# Patient Record
Sex: MALE | Race: WHITE | NOT HISPANIC OR LATINO | Employment: FULL TIME | URBAN - METROPOLITAN AREA
[De-identification: names, ages, dates, MRNs, and addresses within clinical notes are randomized per-mention and may not be internally consistent; named-entity substitution may affect disease eponyms.]

---

## 2020-07-23 ENCOUNTER — OFFICE VISIT (OUTPATIENT)
Dept: URGENT CARE | Facility: URGENT CARE | Age: 40
End: 2020-07-23
Payer: OTHER MISCELLANEOUS

## 2020-07-23 VITALS
DIASTOLIC BLOOD PRESSURE: 78 MMHG | RESPIRATION RATE: 16 BRPM | OXYGEN SATURATION: 100 % | WEIGHT: 215 LBS | SYSTOLIC BLOOD PRESSURE: 116 MMHG | HEART RATE: 60 BPM

## 2020-07-23 DIAGNOSIS — S61.412A LACERATION OF LEFT HAND WITHOUT FOREIGN BODY, INITIAL ENCOUNTER: Primary | ICD-10-CM

## 2020-07-23 PROCEDURE — 12001 RPR S/N/AX/GEN/TRNK 2.5CM/<: CPT | Performed by: NURSE PRACTITIONER

## 2020-07-23 NOTE — PATIENT INSTRUCTIONS
Leave dressing on for 24 hours then cleanse daily with soap and water  Keep covered if going to be working with hands  Tylenol and ibuprofen for pain  Ice to help with pain and swelling.     Patient Education     Extremity Laceration: Stitches, Staples, or Tape  A laceration is a cut through the skin. If it is deep, it may require stitches or staples to close so it can heal. Minor cuts may be treated with surgical tape closures, or skin glue.  X-rays may be done if something may have entered the skin through the cut. You may also need a tetanus shot if you are not up to date on this vaccine.  Home care    Follow the healthcare provider s instructions on how to care for the cut.    Wash your hands with soap and warm water before and after caring for your wound. This is to help prevent infection.    Keep the wound clean and dry. If a bandage was applied and it becomes wet or dirty, replace it. Otherwise, leave it in place for the first 24 hours, then change it once a day or as directed.    If stitches or staples were used, clean the wound daily:  ? After removing the bandage, wash the area with soap and water. Use a wet cotton swab to loosen and remove any blood or crust that forms.  ? After cleaning, keep the wound clean and dry. Talk with your healthcare provider before putting any antibiotic ointment on the wound. Reapply the bandage.    You may remove the bandage to shower as usual after the first 24 hours, but don't soak the area in water (no swimming) until the stitches or staples are removed.    If surgical tape closures were used, keep the area clean and dry. If it becomes wet, blot it dry with a towel. Let the surgical tape fall off on its own.    The healthcare provider may prescribe an antibiotic cream or ointment to prevent infection. He or she may also prescribe an antibiotic pill. Don't stop taking this medicine until you have finished it all or the provider tells you to stop.    The provider may also  prescribe medicine for pain. Follow the instructions for taking these medicines.    Don't do activities that may reopen your wound.  Follow-up care  Follow up with your healthcare provider, or as advised. Most skin wounds heal within 10 days. But an infection may sometimes occur even with proper treatment. Check the wound daily for the signs of infection listed below. Stitches and staples should be removed within 7 to14 days. If surgical tape closures were used, you may remove them after 10 days if they have not fallen off by then.   When to seek medical advice  Call your healthcare provider right away if any of these occur:    Wound bleeding not controlled by direct pressure    Signs of infection, including increasing pain in the wound, increasing wound redness or swelling, or pus or bad odor coming from the wound    Fever of 100.4 F (38 C) or higher, or as directed by your healthcare provider    Stitches or staples come apart or fall out or surgical tape falls off before 7 days    Wound edges reopen    Wound changes colors    Numbness occurs around the wound     Decreased movement around the injured area  Date Last Reviewed: 7/1/2017 2000-2019 [employer]. [address] [place] [id number]. All rights reserved. This information is not intended as a substitute for professional medical care. Always follow your healthcare professional's instructions.

## 2020-07-23 NOTE — PROGRESS NOTES
SUBJECTIVE:     Chief Complaint   Patient presents with     Laceration     Pt cut L hand on a bandsaw blade     Dennis Fox is a 40 year old male who presents to the clinic with a laceration on the left hand sustained 30 minutes ago.  This is a work related and accidental injury.    Mechanism of injury: band saw.    Associated symptoms: Denies numbness, weakness, or loss of function  Last tetanus booster within 10 years: yes - 2017.    EXAM:   The patient appears today in alert,no apparent distress distress  VITALS: /78   Pulse 60   Resp 16   Wt 97.5 kg (215 lb)   SpO2 100%     Size of laceration: 2 centimeters  Characteristics of the laceration: bleeding- mild, dirty and longitudinal  Tendon function intact: yes  Sensation to light touch intact: yes  Pulses intact: yes    Assessment:  Laceration of left hand without foreign body, initial encounter    PLAN:  PROCEDURE NOTE::  Wound was locally injected with 2 cc's of Lidocaine 1% with epinephrine  Prepped and draped in the usual sterile fashion  Wound cleaned with HIBICLENS  Laceration was closed using 3 4-0 nylon interrupted sutures    After care instructions:  Keep wound clean and dry for the next 24-48 hours  Sutures out in 1 week  Signs of infection discussed today  Apply anti-bacterial ointment daily.  May return to work as long as wound is kept clean and dry  Education was added to AVS. Patient was agreeable to plan and verbalized understanding.

## 2020-07-23 NOTE — LETTER
[facility]  [address] [address]  [phone]        2020    REPORT OF WORK ABILITY    PATIENT DATA  Employee Name: Dennis Fox        : 1980   [id number]  Work related injury: YES  Today's date: 2020  Date of injury: 2020     PROVIDER EVALUATION: Please fill in as needed.  Please give copy to employee for employer.  1. Diagnosis: Laceration  2. Treatment: Sutured  3. Medication: Bacitracin Daily. Tylenol and ibuprofen as needed  4. Return to work date: May return today with the following restrictions. Make sure to keep area clean and dry. Avoid pulling on the sutures or activity that would pull on the suture.     RESTRICTIONS: Unlimited unless listed.  Restrictions apply to home and leisure also.  If work within restrictions is not available, the employee is totally disabled.    Medical Examiner: Sawyer Garza CNP      License or registration: [id number]    Next appointment: 1 week for suture removal    CC: Employer, Managed Care Plan/Payor, Patient

## 2020-08-03 ENCOUNTER — OFFICE VISIT (OUTPATIENT)
Dept: URGENT CARE | Facility: URGENT CARE | Age: 40
End: 2020-08-03

## 2020-08-03 VITALS
OXYGEN SATURATION: 97 % | DIASTOLIC BLOOD PRESSURE: 78 MMHG | WEIGHT: 213.2 LBS | HEART RATE: 60 BPM | TEMPERATURE: 98.1 F | SYSTOLIC BLOOD PRESSURE: 116 MMHG | RESPIRATION RATE: 18 BRPM

## 2020-08-03 DIAGNOSIS — Z48.02 VISIT FOR SUTURE REMOVAL: Primary | ICD-10-CM

## 2020-08-03 PROCEDURE — 99024 POSTOP FOLLOW-UP VISIT: CPT

## 2020-08-25 NOTE — PROGRESS NOTES
Dennis Fox presents to the clinic today for  removal of sutures.  The patient has had the sutures in place for days.    There has been no history of infection or drainage.    O: sutures are seen .  The wound is healing well with no signs of infection.    Tetanus status is up to date.    A: Suture removal.    P:  All sutures were easily removed today.  Routine wound care discussed.  The patient will follow up as needed.

## 2021-05-31 ENCOUNTER — RECORDS - HEALTHEAST (OUTPATIENT)
Dept: ADMINISTRATIVE | Facility: CLINIC | Age: 41
End: 2021-05-31

## 2024-09-06 ENCOUNTER — APPOINTMENT (OUTPATIENT)
Dept: ULTRASOUND IMAGING | Facility: CLINIC | Age: 44
End: 2024-09-06
Payer: COMMERCIAL

## 2024-09-06 ENCOUNTER — HOSPITAL ENCOUNTER (EMERGENCY)
Facility: CLINIC | Age: 44
Discharge: HOME | End: 2024-09-06
Payer: COMMERCIAL

## 2024-09-06 VITALS
HEIGHT: 71 IN | RESPIRATION RATE: 16 BRPM | SYSTOLIC BLOOD PRESSURE: 116 MMHG | DIASTOLIC BLOOD PRESSURE: 85 MMHG | BODY MASS INDEX: 31.2 KG/M2 | HEART RATE: 83 BPM | WEIGHT: 222.88 LBS | TEMPERATURE: 97.2 F | OXYGEN SATURATION: 99 %

## 2024-09-06 DIAGNOSIS — I80.00 SUPERFICIAL THROMBOPHLEBITIS OF LOWER EXTREMITY, UNSPECIFIED LATERALITY: ICD-10-CM

## 2024-09-06 PROCEDURE — 93971 EXTREMITY STUDY: CPT | Mod: RT

## 2024-09-06 PROCEDURE — 99284 EMERGENCY DEPT VISIT MOD MDM: CPT | Mod: 25

## 2024-09-06 RX ORDER — APIXABAN 5 MG (74)
KIT ORAL
Qty: 74 EACH | Refills: 0 | Status: SHIPPED | OUTPATIENT
Start: 2024-09-06 | End: 2024-10-06

## 2024-09-06 ASSESSMENT — ACTIVITIES OF DAILY LIVING (ADL)
ADLS_ACUITY_SCORE: 35

## 2024-09-06 ASSESSMENT — COLUMBIA-SUICIDE SEVERITY RATING SCALE - C-SSRS
2. HAVE YOU ACTUALLY HAD ANY THOUGHTS OF KILLING YOURSELF IN THE PAST MONTH?: NO
6. HAVE YOU EVER DONE ANYTHING, STARTED TO DO ANYTHING, OR PREPARED TO DO ANYTHING TO END YOUR LIFE?: NO
1. IN THE PAST MONTH, HAVE YOU WISHED YOU WERE DEAD OR WISHED YOU COULD GO TO SLEEP AND NOT WAKE UP?: NO

## 2024-09-06 NOTE — ED PROVIDER NOTES
"  Emergency Department Note      History of Present Illness     Chief Complaint   Leg Pain      HPI   Dennis Fox is a 44 year old male with a history of DVT of the left lower extremity who presents for evaluation of right leg pain and swelling.  The patient states that about a week ago he developed insidious onset of right distal, medial calf pain that has been persistent.  He has tried elevating his leg without resolution.  He has had associated malaise as well.  He denies any trauma/injury to his leg prior to the onset of his symptoms.  The patient was diagnosed with a DVT of his left lower extremity about 2 years ago and was on anticoagulation for approximately 3 months.  He notes that he had 2 follow-up ultrasounds which were negative for persistent DVT and therefore stopped taking the blood thinners.  He has had genetic testing for clotting factors and has been negative.  He notes that he has a family history of blood clots as well.  Patient denies any fever, upper respiratory symptoms, chest pain, shortness of breath, pleuritic pain, vomiting, numbness, or weakness.  He denies any hormone use or recent periods of extended immobilization including hospitalizations, surgeries, or long plane/car rides.    Independent Historian   None    Review of External Notes   10/27/22: US shows DVT in the popliteal vein extending into the posterior tibial and peroneal veins throughout the calf    Past Medical History     Medical History and Problem List   DVT    Medications   Allopurinol  Aspirin    Surgical History   No pertinent past surgical history.     Physical Exam     Patient Vitals for the past 24 hrs:   BP Temp Pulse Resp SpO2 Height Weight   09/06/24 0912 129/89 97.2  F (36.2  C) 81 16 99 % 1.803 m (5' 11\") 101.1 kg (222 lb 14.2 oz)     Physical Exam  General: Alert, well developed, well nourished. Cooperative.     In mild distress  HEENT:  Head:  Atraumatic  Ears:  External ears are normal  Eyes:   Conjunctivae " normal and EOM are normal. No scleral icterus.    Pupils are equal, round, and reactive to light.   Neck:   Normal range of motion. Neck supple.  CV:  Normal rate, regular rhythm, normal heart sounds and radial pulses are 2+ and symmetric.  No murmur.  Resp:  Breath sounds are clear bilaterally    Non-labored, no retractions or accessory muscle use  MS:  RLE: Tenderness to palpation, mild erythema, and edema proximal to the medial malleolus.  Mild edema and erythema over the dorsum of the lateral foot.  No tenderness over the medial/lateral malleolus, anterior ankle joint, or calcaneus.  Full flexion/extension of the knee and ankle.  Able to move toes without pain or difficulty.  Compartments soft and nontender.  Negative Dunbar test.  DP pulse 2+.  Peripheral sensation intact light touch distally of DP, SP, PT, and sural nerve distributions.  Distal CMS intact.  Normal range of motion.    Back atraumatic.  Skin:  Warm and dry.  No rash or lesions noted.  Neuro:   Alert. Normal strength.   Psych: Normal mood and affect.    Diagnostics   Imaging   US Lower Extremity Venous Duplex Right   Final Result   IMPRESSION: No evidence of deep venous thrombosis.  Superficial venous   thrombus in the right greater saphenous vein as above.      YUNG NO MD            SYSTEM ID:  [id number]        Independent Interpretation   None    ED Course      Medications Administered   Medications - No data to display    Procedures   Procedures     Discussion of Management   None    ED Course        Additional Documentation  None    Medical Decision Making / Diagnosis     CMS Diagnoses: None    MIPS       None    [facility]   Dennis Fox is a 44 year old male who presents for evaluation of a right leg pain and swelling as noted above.  US shows evidence superficial thrombophlebitis which is greater than 5 cm.  A broad differential was considered and signs and symptoms are not consistent with gout, septic arthritis, fracture, or tendon  rupture. There is no evidence of neurovascular compromise. I do not think at this point it represents an infected thrombophlebitis.  Discussed warm compresses and anticoagulation given length of thrombophlebitis and history of DVT.  Ultrasound is reassuring as there is no DVT.  No signs of pulmonary embolism by history or physical exam.  I recommended the patient follow-up with his hematologist and primary care provider this week for reevaluation.  He was advised to return for fever, worsening pain, spreading redness, numbness, weakness, or any other new concerns.  The patient was comfortable with this plan and all questions were answered.      Disposition   The patient was discharged.     Diagnosis     ICD-10-CM    1. Superficial thrombophlebitis of lower extremity, unspecified laterality  I80.00            Discharge Medications   New Prescriptions    APIXABAN STARTER PACK (ELIQUIS DVT/PE STARTER PACK) 5 MG TBPK    Take 10 mg by mouth 2 times daily for 7 days, THEN 5 mg 2 times daily for 23 days.         JANETTE Etienne Carley J, PA-C  09/06/24 1426